# Patient Record
Sex: FEMALE | Race: BLACK OR AFRICAN AMERICAN | NOT HISPANIC OR LATINO | Employment: FULL TIME | URBAN - METROPOLITAN AREA
[De-identification: names, ages, dates, MRNs, and addresses within clinical notes are randomized per-mention and may not be internally consistent; named-entity substitution may affect disease eponyms.]

---

## 2017-07-16 ENCOUNTER — APPOINTMENT (OUTPATIENT)
Dept: GENERAL RADIOLOGY | Facility: HOSPITAL | Age: 47
End: 2017-07-16

## 2017-07-16 ENCOUNTER — HOSPITAL ENCOUNTER (EMERGENCY)
Facility: HOSPITAL | Age: 47
Discharge: HOME | End: 2017-07-16
Attending: EMERGENCY MEDICINE | Admitting: EMERGENCY MEDICINE

## 2017-07-16 VITALS
DIASTOLIC BLOOD PRESSURE: 95 MMHG | RESPIRATION RATE: 18 BRPM | HEART RATE: 83 BPM | TEMPERATURE: 98.1 F | SYSTOLIC BLOOD PRESSURE: 149 MMHG | BODY MASS INDEX: 29.99 KG/M2 | HEIGHT: 65 IN | WEIGHT: 180 LBS | OXYGEN SATURATION: 98 %

## 2017-07-16 DIAGNOSIS — J45.40: Primary | ICD-10-CM

## 2017-07-16 PROCEDURE — 71020 HC CHEST PA AND LATERAL: CPT

## 2017-07-16 PROCEDURE — 99283 EMERGENCY DEPT VISIT LOW MDM: CPT

## 2017-07-16 RX ORDER — METHYLPREDNISOLONE 4 MG/1
TABLET ORAL
Qty: 21 TABLET | Refills: 0 | Status: SHIPPED | OUTPATIENT
Start: 2017-07-16 | End: 2018-06-22

## 2017-07-16 NOTE — ED PROVIDER NOTES
EMERGENCY DEPARTMENT ENCOUNTER    CHIEF COMPLAINT  Chief Complaint: cough   History given by: pt  History limited by: none  Room Number: 23/23  PMD: LAYTON Ackerman      HPI:  Pt is a 46 y.o. female who presents complaining of dry cough for the past week. Associated sx include wheezing. She states she was seen at [facility] and prescribed steroids which initially improved her sx. She has been using breathing treatments at home with mild improvement as well. She denies CP, fever and other complaints at this time.       Duration:  1 week   Onset: gradual   Timing: constant   Location: n/a  Radiation: none  Quality: dry   Intensity/Severity: moderate   Progression: unchanged   Associated Symptoms: wheezing   Aggravating Factors: none  Alleviating Factors: none  Previous Episodes: Pt reports no prior episodes.   Treatment before arrival: She was seen at [facility] last week and prescribed steroids which initially improved her sx.     PAST MEDICAL HISTORY  Active Ambulatory Problems     Diagnosis Date Noted   • No Active Ambulatory Problems     Resolved Ambulatory Problems     Diagnosis Date Noted   • No Resolved Ambulatory Problems     Past Medical History:   Diagnosis Date   • Hypertension    • Palpitation        PAST SURGICAL HISTORY  Past Surgical History:   Procedure Laterality Date   • TUBAL ABDOMINAL LIGATION         FAMILY HISTORY  Family History   Problem Relation Age of Onset   • Hypertension Mother    • Osteoarthritis Mother    • Migraines Mother    • Lung cancer Mother    • Heart failure Maternal Grandmother    • Diabetes Maternal Grandmother    • Hypertension Father        SOCIAL HISTORY  Social History     Social History   • Marital status:      Spouse name: N/A   • Number of children: N/A   • Years of education: N/A     Occupational History   • Not on file.     Social History Main Topics   • Smoking status: Passive Smoke Exposure - Never Smoker   • Smokeless tobacco: Not on file      Comment: mom smoked in  home for years she stated    • Alcohol use No   • Drug use: No   • Sexual activity: Yes     Other Topics Concern   • Not on file     Social History Narrative       ALLERGIES  Codeine and Sulfa antibiotics    REVIEW OF SYSTEMS  Review of Systems   Constitutional: Negative for fever.   HENT: Negative for sore throat.    Eyes: Negative.    Respiratory: Positive for cough and wheezing. Negative for shortness of breath.    Cardiovascular: Negative for chest pain.   Gastrointestinal: Negative for abdominal pain, diarrhea and vomiting.   Genitourinary: Negative for dysuria.   Musculoskeletal: Negative for neck pain.   Skin: Negative for rash.   Allergic/Immunologic: Negative.    Neurological: Negative for weakness, numbness and headaches.   Hematological: Negative.    Psychiatric/Behavioral: Negative.    All other systems reviewed and are negative.      PHYSICAL EXAM  ED Triage Vitals   Temp Heart Rate Resp BP SpO2   07/16/17 0021 07/16/17 0021 07/16/17 0021 07/16/17 0029 07/16/17 0021   98.5 °F (36.9 °C) 116 18 136/85 98 %      Temp src Heart Rate Source Patient Position BP Location FiO2 (%)   07/16/17 0021 07/16/17 0021 07/16/17 0029 07/16/17 0029 --   Tympanic Monitor Lying Right arm        Physical Exam   Constitutional: She is oriented to person, place, and time and well-developed, well-nourished, and in no distress. No distress.   HENT:   Head: Normocephalic and atraumatic.   Eyes: EOM are normal. Pupils are equal, round, and reactive to light.   Neck: Normal range of motion. Neck supple.   Cardiovascular: Normal rate, regular rhythm and normal heart sounds.    Pulmonary/Chest: Effort normal. No respiratory distress. She has wheezes (mild expiratory ).   Dry hacky cough   Abdominal: Soft. There is no tenderness. There is no rebound and no guarding.   Musculoskeletal: Normal range of motion. She exhibits no edema.   Neurological: She is alert and oriented to person, place, and time. She has normal sensation and normal  strength.   Skin: Skin is warm and dry. No rash noted.   Psychiatric: Mood and affect normal.   Nursing note and vitals reviewed.    RADIOLOGY  XR Chest 2 View   Final Result   1. No active disease.       This report was finalized on 7/16/2017 1:04 AM by Marvin Waller MD.               I ordered the above noted radiological studies. Interpreted by radiologist.  Reviewed by me in PACS.       PROCEDURES  Procedures      PROGRESS AND CONSULTS  ED Course     00:40 Ordered CXR for further evaluation.     01:19 At time of exam, spoke with pt about negative CXR and plan to discharge home with prescription for steroids to treat allergic bronchitis. Pt agrees with course of care. Addressed all questions. Stable vitals.         MEDICAL DECISION MAKING  Results were reviewed/discussed with the patient and they were also made aware of online access. Pt also made aware that some labs, such as cultures, will not be resulted during ER visit and follow up with PMD is necessary.     MDM  Number of Diagnoses or Management Options  Allergic bronchitis, moderate persistent, uncomplicated:      Amount and/or Complexity of Data Reviewed  Tests in the radiology section of CPT®: ordered and reviewed (CXR was negative )  Independent visualization of images, tracings, or specimens: yes    Patient Progress  Patient progress: stable         DIAGNOSIS  Final diagnoses:   Allergic bronchitis, moderate persistent, uncomplicated       DISPOSITION  DISCHARGE    Patient discharged in stable condition.    Reviewed implications of results, diagnosis, meds, responsibility to follow up, warning signs and symptoms of possible worsening, potential complications and reasons to return to the ED.    Patient/Family voiced understanding of above instructions.    Discussed plan for discharge, as there is no emergent indication for admission.  Pt/family is agreeable and understands need for follow up and repeat testing.  Pt is aware that discharge does not mean  that nothing is wrong but it indicates no emergency is present that requires admission and they must continue care with follow-up as given below or physician of their choice.     FOLLOW-UP  Tammie Samina, APRN  [address] [facility]  [address]  [place] [id number] [phone]               Medication List      New Prescriptions          MethylPREDNISolone 4 MG tablet   Commonly known as:  MEDROL (KYARA)   Take as directed on package instructions.         Stop          benzonatate 200 MG capsule   Commonly known as:  TESSALON       cefdinir 300 MG capsule   Commonly known as:  OMNICEF       cephalexin 500 MG capsule   Commonly known as:  KEFLEX       ciprofloxacin 500 MG tablet   Commonly known as:  CIPRO       doxycycline 100 MG capsule   Commonly known as:  VIBRAMYCIN       promethazine-dextromethorphan 6.25-15 MG/5ML syrup   Commonly known as:  PROMETHAZINE-DM               Latest Documented Vital Signs:  As of 1:23 AM  BP- 132/83 HR- 91 Temp- 98.5 °F (36.9 °C) (Tympanic) O2 sat- 98%    --  Documentation assistance provided by andi Lino for .  Information recorded by the scribe was done at my direction and has been verified and validated by me.     Marah Lino  07/16/17 0129       Arley Olson MD  07/16/17 0152

## 2017-07-16 NOTE — ED NOTES
Pt presents to the ed with complaints of shortness of breath, coughing,and, and problems clearing her throat.  Pt complains of recent sickness.     Arjun Nixon RN  07/16/17 0029

## 2017-07-16 NOTE — ED NOTES
Pt reports she has been coughing and wheezing for a week or two     Nikos Spangler RN  07/16/17 0021

## 2017-09-28 ENCOUNTER — HOSPITAL ENCOUNTER (EMERGENCY)
Facility: HOSPITAL | Age: 47
Discharge: HOME | End: 2017-09-28
Attending: EMERGENCY MEDICINE | Admitting: EMERGENCY MEDICINE

## 2017-09-28 ENCOUNTER — APPOINTMENT (OUTPATIENT)
Dept: GENERAL RADIOLOGY | Facility: HOSPITAL | Age: 47
End: 2017-09-28

## 2017-09-28 VITALS
HEART RATE: 76 BPM | WEIGHT: 180 LBS | TEMPERATURE: 99.2 F | SYSTOLIC BLOOD PRESSURE: 138 MMHG | RESPIRATION RATE: 16 BRPM | DIASTOLIC BLOOD PRESSURE: 93 MMHG | BODY MASS INDEX: 29.99 KG/M2 | HEIGHT: 65 IN | OXYGEN SATURATION: 97 %

## 2017-09-28 DIAGNOSIS — R07.89 CHEST WALL PAIN: Primary | ICD-10-CM

## 2017-09-28 DIAGNOSIS — R09.1 PLEURISY: ICD-10-CM

## 2017-09-28 DIAGNOSIS — J20.9 ACUTE BRONCHITIS, UNSPECIFIED ORGANISM: ICD-10-CM

## 2017-09-28 LAB
ALBUMIN SERPL-MCNC: 4.2 G/DL (ref 3.5–5.2)
ALBUMIN/GLOB SERPL: 1.2 G/DL
ALP SERPL-CCNC: 55 U/L (ref 39–117)
ALT SERPL W P-5'-P-CCNC: 11 U/L (ref 1–33)
ANION GAP SERPL CALCULATED.3IONS-SCNC: 12.7 MMOL/L
AST SERPL-CCNC: 14 U/L (ref 1–32)
BASOPHILS # BLD AUTO: 0.03 10*3/MM3 (ref 0–0.2)
BASOPHILS NFR BLD AUTO: 0.3 % (ref 0–1.5)
BILIRUB SERPL-MCNC: 0.4 MG/DL (ref 0.1–1.2)
BUN BLD-MCNC: 9 MG/DL (ref 6–20)
BUN/CREAT SERPL: 11 (ref 7–25)
CALCIUM SPEC-SCNC: 9.2 MG/DL (ref 8.6–10.5)
CHLORIDE SERPL-SCNC: 104 MMOL/L (ref 98–107)
CO2 SERPL-SCNC: 23.3 MMOL/L (ref 22–29)
CREAT BLD-MCNC: 0.82 MG/DL (ref 0.57–1)
DEPRECATED RDW RBC AUTO: 40.4 FL (ref 37–54)
EOSINOPHIL # BLD AUTO: 0.3 10*3/MM3 (ref 0–0.7)
EOSINOPHIL NFR BLD AUTO: 3.2 % (ref 0.3–6.2)
ERYTHROCYTE [DISTWIDTH] IN BLOOD BY AUTOMATED COUNT: 14.2 % (ref 11.7–13)
GFR SERPL CREATININE-BSD FRML MDRD: 91 ML/MIN/1.73
GLOBULIN UR ELPH-MCNC: 3.5 GM/DL
GLUCOSE BLD-MCNC: 99 MG/DL (ref 65–99)
HCT VFR BLD AUTO: 33.6 % (ref 35.6–45.5)
HGB BLD-MCNC: 10.9 G/DL (ref 11.9–15.5)
HOLD SPECIMEN: NORMAL
HOLD SPECIMEN: NORMAL
IMM GRANULOCYTES # BLD: 0.02 10*3/MM3 (ref 0–0.03)
IMM GRANULOCYTES NFR BLD: 0.2 % (ref 0–0.5)
LYMPHOCYTES # BLD AUTO: 1.51 10*3/MM3 (ref 0.9–4.8)
LYMPHOCYTES NFR BLD AUTO: 16 % (ref 19.6–45.3)
MCH RBC QN AUTO: 25.6 PG (ref 26.9–32)
MCHC RBC AUTO-ENTMCNC: 32.4 G/DL (ref 32.4–36.3)
MCV RBC AUTO: 79.1 FL (ref 80.5–98.2)
MONOCYTES # BLD AUTO: 0.56 10*3/MM3 (ref 0.2–1.2)
MONOCYTES NFR BLD AUTO: 5.9 % (ref 5–12)
NEUTROPHILS # BLD AUTO: 7.03 10*3/MM3 (ref 1.9–8.1)
NEUTROPHILS NFR BLD AUTO: 74.4 % (ref 42.7–76)
PLATELET # BLD AUTO: 367 10*3/MM3 (ref 140–500)
PMV BLD AUTO: 10.3 FL (ref 6–12)
POTASSIUM BLD-SCNC: 3.6 MMOL/L (ref 3.5–5.2)
PROT SERPL-MCNC: 7.7 G/DL (ref 6–8.5)
RBC # BLD AUTO: 4.25 10*6/MM3 (ref 3.9–5.2)
SODIUM BLD-SCNC: 140 MMOL/L (ref 136–145)
TROPONIN T SERPL-MCNC: <0.01 NG/ML (ref 0–0.03)
WBC NRBC COR # BLD: 9.45 10*3/MM3 (ref 4.5–10.7)
WHOLE BLOOD HOLD SPECIMEN: NORMAL
WHOLE BLOOD HOLD SPECIMEN: NORMAL

## 2017-09-28 PROCEDURE — 71020 HC CHEST PA AND LATERAL: CPT

## 2017-09-28 PROCEDURE — 80053 COMPREHEN METABOLIC PANEL: CPT | Performed by: EMERGENCY MEDICINE

## 2017-09-28 PROCEDURE — 84484 ASSAY OF TROPONIN QUANT: CPT | Performed by: EMERGENCY MEDICINE

## 2017-09-28 PROCEDURE — 93010 ELECTROCARDIOGRAM REPORT: CPT | Performed by: INTERNAL MEDICINE

## 2017-09-28 PROCEDURE — 85025 COMPLETE CBC W/AUTO DIFF WBC: CPT | Performed by: EMERGENCY MEDICINE

## 2017-09-28 PROCEDURE — 99283 EMERGENCY DEPT VISIT LOW MDM: CPT

## 2017-09-28 PROCEDURE — 36415 COLL VENOUS BLD VENIPUNCTURE: CPT | Performed by: EMERGENCY MEDICINE

## 2017-09-28 PROCEDURE — 93005 ELECTROCARDIOGRAM TRACING: CPT

## 2017-09-28 RX ORDER — SODIUM CHLORIDE 0.9 % (FLUSH) 0.9 %
10 SYRINGE (ML) INJECTION AS NEEDED
Status: DISCONTINUED | OUTPATIENT
Start: 2017-09-28 | End: 2017-09-28 | Stop reason: HOSPADM

## 2017-09-28 RX ORDER — GUAIFENESIN 600 MG/1
1200 TABLET, EXTENDED RELEASE ORAL 2 TIMES DAILY PRN
Qty: 20 TABLET | Refills: 0 | Status: SHIPPED | OUTPATIENT
Start: 2017-09-28 | End: 2018-06-22

## 2017-09-28 RX ORDER — ALBUTEROL SULFATE 90 UG/1
2 AEROSOL, METERED RESPIRATORY (INHALATION) EVERY 4 HOURS PRN
Qty: 18 G | Refills: 0 | OUTPATIENT
Start: 2017-09-28 | End: 2018-11-27

## 2017-09-28 RX ORDER — METAXALONE 800 MG/1
800 TABLET ORAL 3 TIMES DAILY
Qty: 15 TABLET | Refills: 0 | Status: SHIPPED | OUTPATIENT
Start: 2017-09-28 | End: 2018-03-15

## 2017-12-28 ENCOUNTER — HOSPITAL ENCOUNTER (EMERGENCY)
Facility: HOSPITAL | Age: 47
Discharge: HOME | End: 2017-12-28
Attending: EMERGENCY MEDICINE | Admitting: EMERGENCY MEDICINE

## 2017-12-28 VITALS
SYSTOLIC BLOOD PRESSURE: 135 MMHG | WEIGHT: 180 LBS | HEART RATE: 103 BPM | BODY MASS INDEX: 29.99 KG/M2 | OXYGEN SATURATION: 97 % | HEIGHT: 65 IN | TEMPERATURE: 99.2 F | RESPIRATION RATE: 18 BRPM | DIASTOLIC BLOOD PRESSURE: 91 MMHG

## 2017-12-28 DIAGNOSIS — J10.1 INFLUENZA A: Primary | ICD-10-CM

## 2017-12-28 LAB
FLUAV AG NPH QL: POSITIVE
FLUBV AG NPH QL IA: NEGATIVE

## 2017-12-28 PROCEDURE — 87804 INFLUENZA ASSAY W/OPTIC: CPT | Performed by: EMERGENCY MEDICINE

## 2017-12-28 PROCEDURE — 99283 EMERGENCY DEPT VISIT LOW MDM: CPT

## 2017-12-28 RX ORDER — OSELTAMIVIR PHOSPHATE 75 MG/1
75 CAPSULE ORAL
Qty: 10 CAPSULE | Refills: 0 | Status: SHIPPED | OUTPATIENT
Start: 2017-12-28 | End: 2018-06-22

## 2018-01-03 ENCOUNTER — LAB REQUISITION (OUTPATIENT)
Dept: LAB | Facility: OTHER | Age: 48
End: 2018-01-03

## 2018-01-03 DIAGNOSIS — Z02.1 PRE-EMPLOYMENT EXAMINATION: ICD-10-CM

## 2018-01-03 PROCEDURE — 86481 TB AG RESPONSE T-CELL SUSP: CPT | Performed by: PREVENTIVE MEDICINE

## 2018-01-04 LAB
MEV IGG SER IA-ACNC: POSITIVE
MUV IGG SER IA-ACNC: POSITIVE
RUBV IGG SERPL IA-ACNC: POSITIVE
VZV IGG SER IA-ACNC: POSITIVE

## 2018-01-05 LAB
TSPOT INTERPRETATION: NEGATIVE
TSPOT NIL CONTROL: 0
TSPOT PANEL A: 0
TSPOT PANEL B: 0
TSPOT POS CONTROL: 90

## 2018-06-22 ENCOUNTER — HOSPITAL ENCOUNTER (EMERGENCY)
Facility: HOSPITAL | Age: 48
Discharge: HOME | End: 2018-06-22
Attending: EMERGENCY MEDICINE | Admitting: EMERGENCY MEDICINE

## 2018-06-22 VITALS
HEART RATE: 96 BPM | DIASTOLIC BLOOD PRESSURE: 93 MMHG | RESPIRATION RATE: 16 BRPM | TEMPERATURE: 99 F | BODY MASS INDEX: 29.99 KG/M2 | SYSTOLIC BLOOD PRESSURE: 147 MMHG | OXYGEN SATURATION: 99 % | WEIGHT: 180 LBS | HEIGHT: 65 IN

## 2018-06-22 DIAGNOSIS — K64.5 THROMBOSED EXTERNAL HEMORRHOID: Primary | ICD-10-CM

## 2018-06-22 PROCEDURE — 99243 OFF/OP CNSLTJ NEW/EST LOW 30: CPT | Performed by: COLON & RECTAL SURGERY

## 2018-06-22 PROCEDURE — 46320 REMOVAL OF HEMORRHOID CLOT: CPT | Performed by: COLON & RECTAL SURGERY

## 2018-06-22 PROCEDURE — 99284 EMERGENCY DEPT VISIT MOD MDM: CPT

## 2018-06-22 RX ORDER — OXYCODONE HYDROCHLORIDE AND ACETAMINOPHEN 5; 325 MG/1; MG/1
1 TABLET ORAL EVERY 4 HOURS PRN
Qty: 24 TABLET | Refills: 0 | OUTPATIENT
Start: 2018-06-22 | End: 2019-01-14

## 2018-06-22 RX ORDER — LIDOCAINE 50 MG/G
OINTMENT TOPICAL
Qty: 50 G | Refills: 0 | OUTPATIENT
Start: 2018-06-22 | End: 2018-11-27

## 2018-06-22 RX ORDER — LIDOCAINE HYDROCHLORIDE AND EPINEPHRINE 10; 10 MG/ML; UG/ML
10 INJECTION, SOLUTION INFILTRATION; PERINEURAL ONCE
Status: COMPLETED | OUTPATIENT
Start: 2018-06-22 | End: 2018-06-22

## 2018-06-22 RX ORDER — POLYETHYLENE GLYCOL 3350 17 G/17G
17 POWDER, FOR SOLUTION ORAL DAILY
Qty: 100 PACKET | Refills: 0 | OUTPATIENT
Start: 2018-06-22 | End: 2019-01-14

## 2018-06-22 RX ADMIN — LIDOCAINE HYDROCHLORIDE AND EPINEPHRINE 10 ML: 10; 10 INJECTION, SOLUTION INFILTRATION; PERINEURAL at 14:09

## 2018-06-22 RX ADMIN — SILVER NITRATE APPLICATORS: 25; 75 STICK TOPICAL at 14:08

## 2018-07-17 PROBLEM — E66.9 OBESE: Status: ACTIVE | Noted: 2018-07-17

## 2018-07-17 RX ORDER — L.RHAMNOSUS/B.ANIMALIS(LACTIS) 3B CELL
1 CAPSULE ORAL
COMMUNITY
Start: 2018-06-11 | End: 2019-03-25

## 2018-07-17 RX ORDER — IBUPROFEN 800 MG/1
TABLET ORAL
COMMUNITY
Start: 2018-06-25 | End: 2019-01-14

## 2018-07-17 RX ORDER — LANOLIN ALCOHOL/MO/W.PET/CERES
325 CREAM (GRAM) TOPICAL
COMMUNITY
Start: 2017-05-17 | End: 2019-01-14

## 2018-07-17 RX ORDER — BUSPIRONE HYDROCHLORIDE 10 MG/1
10 TABLET ORAL
COMMUNITY
Start: 2018-05-21 | End: 2018-11-27

## 2018-07-17 RX ORDER — TOLTERODINE 2 MG/1
2 CAPSULE, EXTENDED RELEASE ORAL
COMMUNITY
Start: 2018-03-27 | End: 2019-01-14

## 2019-03-25 ENCOUNTER — OFFICE VISIT (OUTPATIENT)
Dept: RETAIL CLINIC | Facility: CLINIC | Age: 49
End: 2019-03-25

## 2019-03-25 VITALS
TEMPERATURE: 98.2 F | DIASTOLIC BLOOD PRESSURE: 80 MMHG | HEART RATE: 100 BPM | SYSTOLIC BLOOD PRESSURE: 122 MMHG | OXYGEN SATURATION: 97 % | RESPIRATION RATE: 18 BRPM

## 2019-03-25 DIAGNOSIS — J02.0 ACUTE STREPTOCOCCAL PHARYNGITIS: ICD-10-CM

## 2019-03-25 DIAGNOSIS — J02.9 SORE THROAT: Primary | ICD-10-CM

## 2019-03-25 LAB
EXPIRATION DATE: ABNORMAL
INTERNAL CONTROL: ABNORMAL
Lab: ABNORMAL
S PYO AG THROAT QL: POSITIVE

## 2019-03-25 PROCEDURE — 99213 OFFICE O/P EST LOW 20 MIN: CPT | Performed by: NURSE PRACTITIONER

## 2019-03-25 PROCEDURE — 87880 STREP A ASSAY W/OPTIC: CPT | Performed by: NURSE PRACTITIONER

## 2019-03-25 RX ORDER — AMOXICILLIN 875 MG/1
875 TABLET, COATED ORAL EVERY 12 HOURS SCHEDULED
Qty: 14 TABLET | Refills: 0 | Status: SHIPPED | OUTPATIENT
Start: 2019-03-25 | End: 2019-04-01

## 2019-03-25 NOTE — PATIENT INSTRUCTIONS
Strep Throat  Strep throat is a bacterial infection of the throat. Your health care provider may call the infection tonsillitis or pharyngitis, depending on whether there is swelling in the tonsils or at the back of the throat. Strep throat is most common during the cold months of the year in children who are 5-15 years of age, but it can happen during any season in people of any age. This infection is spread from person to person (contagious) through coughing, sneezing, or close contact.  What are the causes?  Strep throat is caused by the bacteria called Streptococcus pyogenes.  What increases the risk?  This condition is more likely to develop in:  · People who spend time in crowded places where the infection can spread easily.  · People who have close contact with someone who has strep throat.    What are the signs or symptoms?  Symptoms of this condition include:  · Fever or chills.  · Redness, swelling, or pain in the tonsils or throat.  · Pain or difficulty when swallowing.  · White or yellow spots on the tonsils or throat.  · Swollen, tender glands in the neck or under the jaw.  · Red rash all over the body (rare).    How is this diagnosed?  This condition is diagnosed by performing a rapid strep test or by taking a swab of your throat (throat culture test). Results from a rapid strep test are usually ready in a few minutes, but throat culture test results are available after one or two days.  How is this treated?  This condition is treated with antibiotic medicine.  Follow these instructions at home:  Medicines  · Take over-the-counter and prescription medicines only as told by your health care provider.  · Take your antibiotic as told by your health care provider. Do not stop taking the antibiotic even if you start to feel better.  · Have family members who also have a sore throat or fever tested for strep throat. They may need antibiotics if they have the strep infection.  Eating and drinking  · Do not  share food, drinking cups, or personal items that could cause the infection to spread to other people.  · If swallowing is difficult, try eating soft foods until your sore throat feels better.  · Drink enough fluid to keep your urine clear or pale yellow.  General instructions  · Gargle with a salt-water mixture 3-4 times per day or as needed. To make a salt-water mixture, completely dissolve ½-1 tsp of salt in 1 cup of warm water.  · Make sure that all household members wash their hands well.  · Get plenty of rest.  · Stay home from school or work until you have been taking antibiotics for 24 hours.  · Keep all follow-up visits as told by your health care provider. This is important.  Contact a health care provider if:  · The glands in your neck continue to get bigger.  · You develop a rash, cough, or earache.  · You cough up a thick liquid that is green, yellow-brown, or bloody.  · You have pain or discomfort that does not get better with medicine.  · Your problems seem to be getting worse rather than better.  · You have a fever.  Get help right away if:  · You have new symptoms, such as vomiting, severe headache, stiff or painful neck, chest pain, or shortness of breath.  · You have severe throat pain, drooling, or changes in your voice.  · You have swelling of the neck, or the skin on the neck becomes red and tender.  · You have signs of dehydration, such as fatigue, dry mouth, and decreased urination.  · You become increasingly sleepy, or you cannot wake up completely.  · Your joints become red or painful.  This information is not intended to replace advice given to you by your health care provider. Make sure you discuss any questions you have with your health care provider.  Document Released: 12/15/2001 Document Revised: 08/16/2017 Document Reviewed: 04/11/2016  [employer] Interactive Patient Education © 2019 [employer]

## 2019-03-25 NOTE — PROGRESS NOTES
Kendy Tolliver is a 48 y.o. female.     URI    This is a new problem. The current episode started in the past 7 days. The problem has been gradually worsening. The maximum temperature recorded prior to her arrival was 102 - 102.9 F. Associated symptoms include coughing, headaches, nausea, rhinorrhea and a sore throat. She has tried NSAIDs (otc sinus meds) for the symptoms. The treatment provided no relief.        The following portions of the patient's history were reviewed and updated as appropriate: allergies, current medications, past family history, past medical history, past social history, past surgical history and problem list.    Review of Systems   HENT: Positive for rhinorrhea and sore throat.    Respiratory: Positive for cough.    Gastrointestinal: Positive for nausea.   Musculoskeletal: Negative for myalgias.   Neurological: Positive for headache.       Objective   Physical Exam   Constitutional: She is cooperative. No distress.   HENT:   Head: Normocephalic.   Right Ear: Hearing, external ear and ear canal normal. A middle ear effusion is present.   Left Ear: Hearing, external ear and ear canal normal. A middle ear effusion is present.   Nose: Mucosal edema, rhinorrhea and congestion present. Right sinus exhibits maxillary sinus tenderness. Left sinus exhibits maxillary sinus tenderness.   Mouth/Throat: Oropharyngeal exudate, posterior oropharyngeal edema and posterior oropharyngeal erythema present. Tonsils are 2+ on the right. Tonsils are 2+ on the left. Tonsillar exudate.   Unable to acquire adequate throat swab due to patient jerking   Eyes: Conjunctivae, EOM and lids are normal. Pupils are equal, round, and reactive to light.   Neck: Trachea normal and full passive range of motion without pain.   Cardiovascular: Normal rate, regular rhythm and normal pulses.   Pulmonary/Chest: Effort normal and breath sounds normal.   Lymphadenopathy:        Head (right side): Tonsillar adenopathy  present. No submandibular adenopathy present.        Head (left side): Tonsillar adenopathy present.     She has cervical adenopathy.        Right cervical: Superficial cervical adenopathy present.        Left cervical: Superficial cervical adenopathy present.   Neurological: She is alert.   Skin: Skin is warm. Capillary refill takes less than 2 seconds.   Psychiatric: She has a normal mood and affect. Her speech is normal and behavior is normal.   Vitals reviewed.        Assessment/Plan   There are no diagnoses linked to this encounter.

## 2019-05-30 PROBLEM — N30.90 CYSTITIS: Status: ACTIVE | Noted: 2019-05-30

## 2019-06-01 ENCOUNTER — TELEPHONE (OUTPATIENT)
Dept: URGENT CARE | Facility: CLINIC | Age: 49
End: 2019-06-01

## 2019-06-01 NOTE — TELEPHONE ENCOUNTER
Per Dr Mueller, likely not UTI, see PCP if symptoms persist.    Patient notified of results, and expressed understanding.

## 2019-12-29 ENCOUNTER — HOSPITAL ENCOUNTER (EMERGENCY)
Facility: HOSPITAL | Age: 49
Discharge: HOME | End: 2019-12-29
Attending: EMERGENCY MEDICINE | Admitting: EMERGENCY MEDICINE

## 2019-12-29 VITALS
HEART RATE: 81 BPM | BODY MASS INDEX: 28.99 KG/M2 | HEIGHT: 65 IN | SYSTOLIC BLOOD PRESSURE: 138 MMHG | RESPIRATION RATE: 18 BRPM | OXYGEN SATURATION: 100 % | WEIGHT: 174 LBS | DIASTOLIC BLOOD PRESSURE: 91 MMHG | TEMPERATURE: 100 F

## 2019-12-29 DIAGNOSIS — M54.50 ACUTE LOW BACK PAIN WITHOUT SCIATICA, UNSPECIFIED BACK PAIN LATERALITY: ICD-10-CM

## 2019-12-29 DIAGNOSIS — R30.0 DYSURIA: Primary | ICD-10-CM

## 2019-12-29 LAB
BACTERIA UR QL AUTO: ABNORMAL /HPF
BILIRUB UR QL STRIP: NEGATIVE
CLARITY UR: CLEAR
COLOR UR: YELLOW
GLUCOSE UR STRIP-MCNC: NEGATIVE MG/DL
HGB UR QL STRIP.AUTO: NEGATIVE
HYALINE CASTS UR QL AUTO: ABNORMAL /LPF
KETONES UR QL STRIP: NEGATIVE
LEUKOCYTE ESTERASE UR QL STRIP.AUTO: ABNORMAL
NITRITE UR QL STRIP: NEGATIVE
PH UR STRIP.AUTO: 6 [PH] (ref 5–8)
PROT UR QL STRIP: ABNORMAL
RBC # UR: ABNORMAL /HPF
REF LAB TEST METHOD: ABNORMAL
SP GR UR STRIP: 1.01 (ref 1–1.03)
SQUAMOUS #/AREA URNS HPF: ABNORMAL /HPF
UROBILINOGEN UR QL STRIP: ABNORMAL
WBC UR QL AUTO: ABNORMAL /HPF

## 2019-12-29 PROCEDURE — 81001 URINALYSIS AUTO W/SCOPE: CPT

## 2019-12-29 PROCEDURE — 99284 EMERGENCY DEPT VISIT MOD MDM: CPT

## 2019-12-29 RX ORDER — PHENAZOPYRIDINE HYDROCHLORIDE 200 MG/1
200 TABLET, FILM COATED ORAL 3 TIMES DAILY PRN
Qty: 6 TABLET | Refills: 0 | OUTPATIENT
Start: 2019-12-29 | End: 2020-12-09

## 2019-12-29 RX ORDER — NITROFURANTOIN 25; 75 MG/1; MG/1
100 CAPSULE ORAL 2 TIMES DAILY
Qty: 14 CAPSULE | Refills: 0 | Status: SHIPPED | OUTPATIENT
Start: 2019-12-29 | End: 2020-01-05

## 2019-12-29 RX ORDER — PHENAZOPYRIDINE HYDROCHLORIDE 100 MG/1
200 TABLET, FILM COATED ORAL ONCE
Status: COMPLETED | OUTPATIENT
Start: 2019-12-29 | End: 2019-12-29

## 2019-12-29 RX ADMIN — PHENAZOPYRIDINE 200 MG: 100 TABLET ORAL at 12:03
